# Patient Record
Sex: MALE | Race: WHITE | HISPANIC OR LATINO | Employment: OTHER | URBAN - METROPOLITAN AREA
[De-identification: names, ages, dates, MRNs, and addresses within clinical notes are randomized per-mention and may not be internally consistent; named-entity substitution may affect disease eponyms.]

---

## 2020-05-09 ENCOUNTER — HOSPITAL ENCOUNTER (EMERGENCY)
Facility: HOSPITAL | Age: 50
Discharge: HOME | End: 2020-05-09
Attending: EMERGENCY MEDICINE

## 2020-05-09 VITALS
DIASTOLIC BLOOD PRESSURE: 90 MMHG | HEIGHT: 63 IN | RESPIRATION RATE: 18 BRPM | SYSTOLIC BLOOD PRESSURE: 146 MMHG | HEART RATE: 63 BPM | TEMPERATURE: 98 F | OXYGEN SATURATION: 97 % | WEIGHT: 200 LBS | BODY MASS INDEX: 35.44 KG/M2

## 2020-05-09 DIAGNOSIS — S05.01XA ABRASION OF RIGHT CORNEA, INITIAL ENCOUNTER: Primary | ICD-10-CM

## 2020-05-09 PROCEDURE — 99283 EMERGENCY DEPT VISIT LOW MDM: CPT

## 2020-05-09 PROCEDURE — 25000003 PHARM REV CODE 250: Performed by: EMERGENCY MEDICINE

## 2020-05-09 RX ORDER — TETRACAINE HYDROCHLORIDE 5 MG/ML
2 SOLUTION OPHTHALMIC
Status: COMPLETED | OUTPATIENT
Start: 2020-05-09 | End: 2020-05-09

## 2020-05-09 RX ORDER — ERYTHROMYCIN 5 MG/G
OINTMENT OPHTHALMIC
Qty: 1 TUBE | Refills: 0 | Status: SHIPPED | OUTPATIENT
Start: 2020-05-09

## 2020-05-09 RX ADMIN — TETRACAINE HYDROCHLORIDE 2 DROP: 5 SOLUTION OPHTHALMIC at 09:05

## 2020-05-09 RX ADMIN — FLUORESCEIN SODIUM 1 EACH: 1 STRIP OPHTHALMIC at 09:05

## 2020-05-09 NOTE — DISCHARGE INSTRUCTIONS
Follow up with an eye doctor in 48-72 hours if possible.    Return to the emergency department for any new or worsening symptoms.

## 2020-05-09 NOTE — ED PROVIDER NOTES
"Encounter Date: 5/9/2020    SCRIBE #1 NOTE: I, Soumya Espinosa, am scribing for, and in the presence of,  Dr. Barrios . I have scribed the entire note.       History     Chief Complaint   Patient presents with    Eye Problem     Pt presents to ED today c/o right eye pain after getting debris in it 4 days ago while cutting plywood with a saw.      The patient is 49 year-old male with a past medical history of hypertension and anxiety who presents to the ED with right eye pain for the past 4 days. Patient reports onset of symptoms while cutting plywood with a chainsaw, noting he got dust in his eye. Since that time he mentions pain, eye redness and a FB sensation. He describes the sensation as a "scratchy" feeling. He denies any vision change, floaters, visual field defect, eye discharge, pain on eye movements, headache, nausea, vomiting. He to rinsing the eye out with water with no relief. The patient does not wear contact lenses. Pt deneis hx of seasonal allergies.     The history is provided by the patient. The history is limited by a language barrier (Language line services used for Bulgarian interpretation. #[id number] ).     Review of patient's allergies indicates:  No Known Allergies  Past Medical History:   Diagnosis Date    Anxiety     Hypertension      History reviewed. No pertinent surgical history.  History reviewed. No pertinent family history.  Social History     Tobacco Use    Smoking status: Never Smoker   Substance Use Topics    Alcohol use: No    Drug use: No     Review of Systems   Constitutional: Negative for chills and fever.   HENT: Negative for congestion, ear pain, rhinorrhea and sore throat.    Eyes: Positive for redness and itching. Negative for pain, discharge and visual disturbance.   Respiratory: Negative for cough, shortness of breath and wheezing.    Cardiovascular: Negative for chest pain and palpitations.   Gastrointestinal: Negative for abdominal pain, diarrhea, nausea and " vomiting.   Genitourinary: Negative for dysuria and hematuria.   Musculoskeletal: Negative for back pain, myalgias and neck pain.   Skin: Negative for rash.   Neurological: Negative for dizziness, weakness, light-headedness and headaches.   Psychiatric/Behavioral: Negative for confusion.       Physical Exam     Initial Vitals   BP Pulse Resp Temp SpO2   05/09/20 0903 05/09/20 0903 05/09/20 0903 05/09/20 0908 05/09/20 0903   (!) 146/90 66 18 98.3 °F (36.8 °C) 97 %      MAP       --                Physical Exam    Nursing note and vitals reviewed.  Constitutional: He appears well-developed and well-nourished. He is not diaphoretic. No distress.   HENT:   Head: Normocephalic and atraumatic.   Mouth/Throat: Oropharynx is clear and moist.   Eyes: Conjunctivae, EOM and lids are normal. Pupils are equal, round, and reactive to light. Lids are everted and swept, no foreign bodies found. Right eye exhibits no chemosis, no discharge, no exudate and no hordeolum. No foreign body present in the right eye. Right eye exhibits normal extraocular motion and no nystagmus. Left eye exhibits normal extraocular motion and no nystagmus.   Slit lamp exam:       The right eye shows no corneal ulcer, no foreign body, no hyphema and no hypopyon.   R eye stained with fluorescein with uptake suggestive of corneal abrasion; no corneal ulcer; no hyphema; no hypopyon; no proptosis; no lid or orbital erythema; no discharge; visual acuity WNL; R eyelid everted, swept - no FB noted.    Neck: Normal range of motion. Neck supple.   Cardiovascular: Intact distal pulses.   Pulmonary/Chest: No respiratory distress.   Musculoskeletal: Normal range of motion. He exhibits no edema or tenderness.   Lymphadenopathy:     He has no cervical adenopathy.   Neurological: He is alert and oriented to person, place, and time. He has normal strength.   Skin: Skin is warm and dry. No rash noted.         ED Course   Procedures  Labs Reviewed - No data to display        Imaging Results    None          Medical Decision Making:   ED Management:  - history and physical exam suggestive of corneal abrasion  - no findings to suggest corneal ulcer, hyphema, hypopyon, retinal detachment, globe rupture, pre-orbital/orbital cellulitis or other ophthalmologic pathology   - will treat as OP with topical erythromycin ointment for simple corneal ulcer   - pt instructed to f/u with his ophthalmologist in next 48-72 hours  - pt given strict return precautions for any new or worsening of symptoms  - encountered aided with use of virtual Hungarian  (pt's preferred language)  - pt stable for discharge  - No further intervention is indicated at this time after having taken into account the patient's history, physical exam findings, and empirical and objective data obtained during the patient's emergency department workup.   - The patient is at low risk for an emergent medical condition at this time, and I am of the belief that that it is safe to discharge the patient from the emergency department.   - The patient is instructed to follow up as outpatient as indicated on the discharge paperwork.    - I have discussed the specifics of the workup with the patient and the patient has verbalized understanding of the details of the workup, the diagnosis, the treatment plan, and the need for outpatient follow-up.    - Although the patient has no emergent etiology today this does not preclude the development of an emergent condition so, in addition, I have advised the patient that they can return to the ED and/or activate EMS at any time with worsening of their symptoms, change of their symptoms, or with any other medical complaint.    - The patient remained comfortable and stable during their visit in the ED.    - Discharge and follow-up instructions discussed with the patient who expressed understanding and willingness to comply with my recommendations.  - Results of all emergency department  tests  discussed thoroughly with patient; all patient questions answered; pt in agreement with plan  - Pt instructed to follow up with PCP in 2-3 days for recheck of today's complaints  - Pt given strict emergency department return precautions for any new or worsening of symptoms  - Pt discharged from the emergency department in stable condition, in no acute distress                                   Clinical Impression:       ICD-10-CM ICD-9-CM   1. Abrasion of right cornea, initial encounter S05.01XA 918.1               ED Disposition Condition    Discharge Stable        ED Prescriptions     Medication Sig Dispense Start Date End Date Auth. Provider    erythromycin (ROMYCIN) ophthalmic ointment Place a 1/2 inch ribbon of ointment into the lower eyelid 4 times daily for 5 days. 1 Tube 5/9/2020  Abundio Barrios MD        Follow-up Information     Follow up With Specialties Details Why Contact Info [facility]  If symptoms worsen [address] [id number] [phone]    Primary Doctor No   Follow up wiht your primary care physician in the next 2-3 days for recheck of today's complaints.                           I, Abundio Barrios,  personally performed the services described in this documentation. All medical record entries made by the scribe were at my direction and in my presence.  I have reviewed the chart and agree that the record reflects my personal performance and is accurate and complete. Abundio Barrios M.D. 9:51 AM05/09/2020           Abundio Barrios MD  05/09/20 0951

## 2020-05-09 NOTE — ED TRIAGE NOTES
Pt presents to ED today c/o right eye pain after getting debris in it 4 days ago while cutting plywood with a saw.

## 2021-03-31 ENCOUNTER — IMMUNIZATION (OUTPATIENT)
Dept: PRIMARY CARE CLINIC | Facility: CLINIC | Age: 51
End: 2021-03-31
Payer: OTHER GOVERNMENT

## 2021-03-31 DIAGNOSIS — Z23 NEED FOR VACCINATION: Primary | ICD-10-CM

## 2021-03-31 PROCEDURE — 0001A COVID-19, MRNA, LNP-S, PF, 30 MCG/0.3 ML DOSE VACCINE: CPT | Mod: CV19,S$GLB,, | Performed by: INTERNAL MEDICINE

## 2021-03-31 PROCEDURE — 91300 COVID-19, MRNA, LNP-S, PF, 30 MCG/0.3 ML DOSE VACCINE: CPT | Mod: S$GLB,,, | Performed by: INTERNAL MEDICINE

## 2021-03-31 PROCEDURE — 0001A COVID-19, MRNA, LNP-S, PF, 30 MCG/0.3 ML DOSE VACCINE: ICD-10-PCS | Mod: CV19,S$GLB,, | Performed by: INTERNAL MEDICINE

## 2021-03-31 PROCEDURE — 91300 COVID-19, MRNA, LNP-S, PF, 30 MCG/0.3 ML DOSE VACCINE: ICD-10-PCS | Mod: S$GLB,,, | Performed by: INTERNAL MEDICINE

## 2021-04-21 ENCOUNTER — IMMUNIZATION (OUTPATIENT)
Dept: PRIMARY CARE CLINIC | Facility: CLINIC | Age: 51
End: 2021-04-21

## 2021-04-21 DIAGNOSIS — Z23 NEED FOR VACCINATION: Primary | ICD-10-CM

## 2021-04-21 PROCEDURE — 91300 COVID-19, MRNA, LNP-S, PF, 30 MCG/0.3 ML DOSE VACCINE: CPT | Mod: S$GLB,,, | Performed by: INTERNAL MEDICINE

## 2021-04-21 PROCEDURE — 91300 COVID-19, MRNA, LNP-S, PF, 30 MCG/0.3 ML DOSE VACCINE: ICD-10-PCS | Mod: S$GLB,,, | Performed by: INTERNAL MEDICINE

## 2021-04-21 PROCEDURE — 0002A COVID-19, MRNA, LNP-S, PF, 30 MCG/0.3 ML DOSE VACCINE: ICD-10-PCS | Mod: CV19,S$GLB,, | Performed by: INTERNAL MEDICINE

## 2021-04-21 PROCEDURE — 0002A COVID-19, MRNA, LNP-S, PF, 30 MCG/0.3 ML DOSE VACCINE: CPT | Mod: CV19,S$GLB,, | Performed by: INTERNAL MEDICINE

## 2023-05-15 ENCOUNTER — TELEPHONE (OUTPATIENT)
Dept: NEUROSURGERY | Facility: CLINIC | Age: 53
End: 2023-05-15

## 2023-05-15 NOTE — TELEPHONE ENCOUNTER
Spoke with pt. He says his ibuprofen for shoulder pain isnt working. I provided him the phone number for primary care so that he could be worked up to see if this pain is something a neurosurgeon would be needed for. Pt. Has no imaging nor referrals. Pt verbalized understanding.

## 2023-05-15 NOTE — TELEPHONE ENCOUNTER
----- Message from Will Trinidad sent at 5/15/2023  1:45 PM CDT -----  Contact: pt at  [phone] or [phone]  Type:  Sooner Appointment Request    Caller is requesting a sooner appointment.  Caller declined first available appointment listed below.  Caller will not accept being placed on the waitlist and is requesting a message be sent to doctor.    Name of Caller:  pt  When is the first available appointment?  N/A  Symptoms:  severe pain in left shoulder  Best Call Back Number:  [phone] or [phone]  Additional Information:  pt is calling the office to schedule an appt due to severe pain in left shoulder   Aand no appts come up.